# Patient Record
(demographics unavailable — no encounter records)

---

## 2025-04-02 NOTE — HISTORY OF PRESENT ILLNESS
[de-identified] : 04/01/2025: Patient is a 44 year old male presenting for evaluation of left great toe pain. States pain has been present since 1 year ago without injury, but noticed pain to start after wearing tight fitting dress shoes all day. The pain is intermittent since it started. At rest, no pain. With activity, the pain worsens with walking and wearing tight shoes. He has not noticed swelling. He has tried voltaren with no improvement. The pain radiates no where. He denies history of similar pain in the past.

## 2025-04-02 NOTE — PHYSICAL EXAM
[de-identified] : Constitutional: Well developed, well nourished, able to communicate Neuro: Normal sensation, No focal deficits Skin: Intact CV: Peripheral vascular exam grossly normal Pulm: No signs of respiratory distress Psych: Oriented, normal mood and affect  Left foot exam: - No obvious deformity - Pain with palpation over the medial aspect of the first MTP - Range of motion limited in flexion extension of the first MTP with pain presenting - 5/5 strength with first toe flexion and extension - Stable varus and valgus stressing of the first MTP - Stable volar stressing of the first MTP - Distally neurovascular intact [de-identified] : 3 view XR left foot showing findings of previous first toe IP fracture with malunion.  Mild first MTP degenerative changes seen as well.  No acute injury.  No dislocation

## 2025-04-02 NOTE — PHYSICAL EXAM
[de-identified] : Constitutional: Well developed, well nourished, able to communicate Neuro: Normal sensation, No focal deficits Skin: Intact CV: Peripheral vascular exam grossly normal Pulm: No signs of respiratory distress Psych: Oriented, normal mood and affect  Left foot exam: - No obvious deformity - Pain with palpation over the medial aspect of the first MTP - Range of motion limited in flexion extension of the first MTP with pain presenting - 5/5 strength with first toe flexion and extension - Stable varus and valgus stressing of the first MTP - Stable volar stressing of the first MTP - Distally neurovascular intact [de-identified] : 3 view XR left foot showing findings of previous first toe IP fracture with malunion.  Mild first MTP degenerative changes seen as well.  No acute injury.  No dislocation

## 2025-04-02 NOTE — HISTORY OF PRESENT ILLNESS
[de-identified] : 04/01/2025: Patient is a 44 year old male presenting for evaluation of left great toe pain. States pain has been present since 1 year ago without injury, but noticed pain to start after wearing tight fitting dress shoes all day. The pain is intermittent since it started. At rest, no pain. With activity, the pain worsens with walking and wearing tight shoes. He has not noticed swelling. He has tried voltaren with no improvement. The pain radiates no where. He denies history of similar pain in the past.

## 2025-04-02 NOTE — HISTORY OF PRESENT ILLNESS
[de-identified] : 04/01/2025: Patient is a 44 year old male presenting for evaluation of left great toe pain. States pain has been present since 1 year ago without injury, but noticed pain to start after wearing tight fitting dress shoes all day. The pain is intermittent since it started. At rest, no pain. With activity, the pain worsens with walking and wearing tight shoes. He has not noticed swelling. He has tried voltaren with no improvement. The pain radiates no where. He denies history of similar pain in the past.

## 2025-04-02 NOTE — PHYSICAL EXAM
[de-identified] : Constitutional: Well developed, well nourished, able to communicate Neuro: Normal sensation, No focal deficits Skin: Intact CV: Peripheral vascular exam grossly normal Pulm: No signs of respiratory distress Psych: Oriented, normal mood and affect  Left foot exam: - No obvious deformity - Pain with palpation over the medial aspect of the first MTP - Range of motion limited in flexion extension of the first MTP with pain presenting - 5/5 strength with first toe flexion and extension - Stable varus and valgus stressing of the first MTP - Stable volar stressing of the first MTP - Distally neurovascular intact [de-identified] : 3 view XR left foot showing findings of previous first toe IP fracture with malunion.  Mild first MTP degenerative changes seen as well.  No acute injury.  No dislocation

## 2025-04-02 NOTE — ASSESSMENT
[FreeTextEntry1] : 1 year of intermittent left first toe MTP pain with findings of mild degenerative changes on x-ray and with hallux rigidus.  X-rays showing history of likely first toe IP malunion fracture from the past which does not seem to cause him pain today - Discussed shoewear modifications - Discussed Olivera's extension carbon fiber insert - Discussed exercise modifications - Discussed using Voltaren more regularly than once a day - May use ice, Tylenol as needed - Discussed the role of corticosteroid injections.  He has had 1 in the past without benefit so advised him I would not recommend 1 today yet - Follow-up in 1 month.  Can consider corticosteroid injection at this time if needed